# Patient Record
Sex: MALE | Race: WHITE | NOT HISPANIC OR LATINO
[De-identification: names, ages, dates, MRNs, and addresses within clinical notes are randomized per-mention and may not be internally consistent; named-entity substitution may affect disease eponyms.]

---

## 2023-01-05 PROBLEM — Z00.00 ENCOUNTER FOR PREVENTIVE HEALTH EXAMINATION: Status: ACTIVE | Noted: 2023-01-05

## 2023-01-09 DIAGNOSIS — A69.20 LYME DISEASE, UNSPECIFIED: ICD-10-CM

## 2023-01-09 DIAGNOSIS — Z78.9 OTHER SPECIFIED HEALTH STATUS: ICD-10-CM

## 2023-01-09 DIAGNOSIS — M51.27 OTHER INTERVERTEBRAL DISC DISPLACEMENT, LUMBOSACRAL REGION: ICD-10-CM

## 2023-01-09 DIAGNOSIS — K21.9 GASTRO-ESOPHAGEAL REFLUX DISEASE W/OUT ESOPHAGITIS: ICD-10-CM

## 2023-01-10 ENCOUNTER — APPOINTMENT (OUTPATIENT)
Dept: SURGERY | Facility: CLINIC | Age: 53
End: 2023-01-10
Payer: COMMERCIAL

## 2023-01-10 VITALS
HEIGHT: 70 IN | BODY MASS INDEX: 18.61 KG/M2 | WEIGHT: 130 LBS | HEART RATE: 60 BPM | SYSTOLIC BLOOD PRESSURE: 112 MMHG | DIASTOLIC BLOOD PRESSURE: 64 MMHG

## 2023-01-10 DIAGNOSIS — Z86.19 PERSONAL HISTORY OF OTHER INFECTIOUS AND PARASITIC DISEASES: ICD-10-CM

## 2023-01-10 PROCEDURE — 99204 OFFICE O/P NEW MOD 45 MIN: CPT

## 2023-01-10 NOTE — CONSULT LETTER
[Dear  ___] : Dear  [unfilled], [Consult Letter:] : I had the pleasure of evaluating your patient, [unfilled]. [Please see my note below.] : Please see my note below. [Consult Closing:] : Thank you very much for allowing me to participate in the care of this patient.  If you have any questions, please do not hesitate to contact me. [Sincerely,] : Sincerely, [FreeTextEntry3] : Kika Prieto MD FACS\par Director, [facility] Division of General Surgery \par Acute Care Surgery\par [facility]\par \par Office phone: [phone]\par Cell phone:  [phone]\par email:  [email or web address]\par

## 2023-01-10 NOTE — HISTORY OF PRESENT ILLNESS
[de-identified] : 52 yr old w pain in the left groin since Nov 23 - some pain in the right groin as well.  + bulge bilaterally.  He takes ibuprofen with little relief.  It is worse with sitting, best when lying down.  Denies obstructive symptoms. Also endorses some burning and tugging sensation to the groinds. Straining also irritates the area. 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  His only prior surgery is an open appendectomy in the past. He takes no meds and has no allergies. \par He lives w a friend as he and his wife are .

## 2023-01-10 NOTE — PLAN
[FreeTextEntry1] : IVAN BRUNNER has a  biilateral nguinal hernias determined by physical exam.  We discussed the options for management which include observation of asymptomatic or minimally symptomatic hernias, as well as surgical repair.  I have recommended a robotic approach to this particular hernia given the quicker recovery time and superior visualization of the anatomy.  We discussed the risks of the surgery which include, but are not limited to injury to the gonadal structures, intestinal injury, vascular injury, hernia recurrence, mesh infection, seroma, or hematoma.  We discussed the likelihood of bruising and swelling in the immediate post op period in the groin and that this should be managed with ice packs for the first 24 hrs. \par Post operative activity restrictions include no heavy lifting or core activities/straining for 10 days. Routine daily activities can be resumed in 1-2 days and light activity (such as walking for exercise or jogging) can be resumed in 7 days.  Driving can be resumed when pain level is felt to be manageable.   No swimming in pool or hot tub for 1 week post operatively.  Return to work is generally in 7-10 days, but is individualized according to the patient.  Mr. BRUNNER understands the risks benefits and alternatives of the proposed inguinal hernia repair and would like to proceed as soon as possible given the discomfort he is in. \par \par We will determine a date for surgery at the his convenience and obtain medical clearance as appropriate. \par \par \par

## 2023-01-10 NOTE — PHYSICAL EXAM
[Respiratory Effort] : normal respiratory effort [de-identified] : NAD [de-identified] : moderate size bilateral inguinal hernias, reducible.  + bulge bilaterally. minimaly tender.

## 2023-01-10 NOTE — REASON FOR VISIT
[Initial Eval - Existing Diagnosis] : an initial evaluation of an existing diagnosis [FreeTextEntry1] : bilateral inguinal hernia

## 2023-01-23 ENCOUNTER — APPOINTMENT (OUTPATIENT)
Dept: SURGERY | Facility: HOSPITAL | Age: 53
End: 2023-01-23

## 2023-01-23 ENCOUNTER — TRANSCRIPTION ENCOUNTER (OUTPATIENT)
Age: 53
End: 2023-01-23

## 2023-01-24 ENCOUNTER — TRANSCRIPTION ENCOUNTER (OUTPATIENT)
Age: 53
End: 2023-01-24

## 2023-01-31 ENCOUNTER — APPOINTMENT (OUTPATIENT)
Dept: SURGERY | Facility: CLINIC | Age: 53
End: 2023-01-31
Payer: COMMERCIAL

## 2023-01-31 VITALS
HEIGHT: 70 IN | WEIGHT: 131 LBS | DIASTOLIC BLOOD PRESSURE: 76 MMHG | TEMPERATURE: 98.2 F | SYSTOLIC BLOOD PRESSURE: 126 MMHG | HEART RATE: 49 BPM | BODY MASS INDEX: 18.75 KG/M2

## 2023-01-31 DIAGNOSIS — K40.20 BILATERAL INGUINAL HERNIA, W/OUT OBSTRUCTION OR GANGRENE, NOT SPECIFIED AS RECURRENT: ICD-10-CM

## 2023-01-31 PROCEDURE — 99024 POSTOP FOLLOW-UP VISIT: CPT

## 2023-01-31 NOTE — CONSULT LETTER
[Dear  ___] : Dear  [unfilled], [Consult Letter:] : I had the pleasure of evaluating your patient, [unfilled]. [Please see my note below.] : Please see my note below. [Consult Closing:] : Thank you very much for allowing me to participate in the care of this patient.  If you have any questions, please do not hesitate to contact me. [Sincerely,] : Sincerely, [FreeTextEntry3] : Kika Prieto MD FACS\par Director, [facility] Division of General Surgery \par Acute Care Surgery\par [facility]\par \par Office phone: [phone]\par Cell phone:  [phone]\par email:  [email or web address]\par

## 2023-01-31 NOTE — HISTORY OF PRESENT ILLNESS
[de-identified] : 52 yr old male s/p robotic bilateral inguinal hernia repair w mesh on 1/ 23/23.  He is only 8 days post op but doing very well. He has no pain and has been walking 4 miles a day. He is tolerating a diet and having normal bowel movements.  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

## 2023-01-31 NOTE — PLAN
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  Otherwise he has no restrictions and may follow up as needed.

## 2023-03-08 ENCOUNTER — NON-APPOINTMENT (OUTPATIENT)
Age: 53
End: 2023-03-08

## 2024-04-22 ENCOUNTER — NON-APPOINTMENT (OUTPATIENT)
Age: 54
End: 2024-04-22

## 2024-04-23 ENCOUNTER — APPOINTMENT (OUTPATIENT)
Dept: SURGERY | Facility: CLINIC | Age: 54
End: 2024-04-23
Payer: COMMERCIAL

## 2024-04-23 VITALS — TEMPERATURE: 97.9 F | DIASTOLIC BLOOD PRESSURE: 77 MMHG | HEART RATE: 51 BPM | SYSTOLIC BLOOD PRESSURE: 135 MMHG

## 2024-04-23 DIAGNOSIS — R10.32 LEFT LOWER QUADRANT PAIN: ICD-10-CM

## 2024-04-23 PROCEDURE — 99214 OFFICE O/P EST MOD 30 MIN: CPT

## 2024-04-23 NOTE — ASSESSMENT
[FreeTextEntry1] : Left groin pain 1 yr after bilateral robotic inguinal hernia repair w mesh.   Could be due to nerve pain or possible early recurrence.   Muscle strain a possibility but less likely given the improvement of pain throughout the duration of his exercise run.

## 2024-04-23 NOTE — PLAN
[FreeTextEntry1] : MRI pelvis to evaluate.   Will call patient with results once performed and read.

## 2024-04-23 NOTE — HISTORY OF PRESENT ILLNESS
[de-identified] : 53 yr old pt of Dr. Meraz, , who had robotic bilateral inguinal hernia surgery in Jan 2023. He did very well post op and actually completed a 1200 mile walk after surgery.  He states he began having pain in the left ischial area, especially when he sits, in the beginning of Jan 2024.  He now notices that he has left groin pain for the past month.  He went on a 4 mile run yesterday and it only hurt in the beginning - and then goes away.  He does not see a recurrent bulge, but states the discomfort feels "deeper" and radiates to his upper inner left thigh.  He does not describe it as a burning pain -only a discomfort.

## 2024-04-23 NOTE — PHYSICAL EXAM
[Alert] : alert [Oriented to Person] : oriented to person [Oriented to Place] : oriented to place [Calm] : calm [de-identified] : NAD [de-identified] : no recurrent inguinal hernia bulge on either left or right. nontender to palpation in left groin.   [de-identified] : no evidence of abscess or irriation in the perirectal or ischial tuberosity area.

## 2024-04-23 NOTE — CONSULT LETTER
[Dear  ___] : Dear  [unfilled], [Courtesy Letter:] : I had the pleasure of seeing your patient, [unfilled], in my office today. [Please see my note below.] : Please see my note below. [Consult Closing:] : Thank you very much for allowing me to participate in the care of this patient.  If you have any questions, please do not hesitate to contact me. [Sincerely,] : Sincerely, [FreeTextEntry3] : Kika Prieto MD FACS Director, [facility] Division of General and Acute Care Surgery Director, Surgical Quality for [facility] Interim Director [facility]   Office phone: [phone] Cell phone:  [phone] email:  [email or web address]

## 2025-02-27 ENCOUNTER — NON-APPOINTMENT (OUTPATIENT)
Age: 55
End: 2025-02-27

## 2025-03-03 PROBLEM — Z00.00 ENCOUNTER FOR PREVENTIVE HEALTH EXAMINATION: Status: ACTIVE | Noted: 2025-03-03

## 2025-03-12 ENCOUNTER — APPOINTMENT (OUTPATIENT)
Dept: UROLOGY | Facility: CLINIC | Age: 55
End: 2025-03-12
Payer: COMMERCIAL

## 2025-03-12 VITALS
WEIGHT: 140 LBS | HEART RATE: 62 BPM | SYSTOLIC BLOOD PRESSURE: 120 MMHG | DIASTOLIC BLOOD PRESSURE: 78 MMHG | HEIGHT: 68 IN | BODY MASS INDEX: 21.22 KG/M2

## 2025-03-12 DIAGNOSIS — N50.811 RIGHT TESTICULAR PAIN: ICD-10-CM

## 2025-03-12 DIAGNOSIS — Z87.19 PERSONAL HISTORY OF OTHER DISEASES OF THE DIGESTIVE SYSTEM: ICD-10-CM

## 2025-03-12 RX ORDER — TAMSULOSIN HYDROCHLORIDE 0.4 MG/1
0.4 CAPSULE ORAL
Qty: 30 | Refills: 2 | Status: ACTIVE | COMMUNITY
Start: 2025-03-12 | End: 1900-01-01

## 2025-04-23 ENCOUNTER — NON-APPOINTMENT (OUTPATIENT)
Age: 55
End: 2025-04-23

## 2025-04-23 ENCOUNTER — APPOINTMENT (OUTPATIENT)
Dept: UROLOGY | Facility: CLINIC | Age: 55
End: 2025-04-23
Payer: COMMERCIAL

## 2025-04-23 VITALS
BODY MASS INDEX: 21.22 KG/M2 | HEIGHT: 68 IN | DIASTOLIC BLOOD PRESSURE: 80 MMHG | HEART RATE: 47 BPM | SYSTOLIC BLOOD PRESSURE: 130 MMHG | WEIGHT: 140 LBS

## 2025-04-23 DIAGNOSIS — R31.29 OTHER MICROSCOPIC HEMATURIA: ICD-10-CM

## 2025-04-23 PROCEDURE — 99203 OFFICE O/P NEW LOW 30 MIN: CPT

## 2025-04-23 PROCEDURE — 81015 MICROSCOPIC EXAM OF URINE: CPT

## 2025-04-23 RX ORDER — TADALAFIL 5 MG/1
5 TABLET ORAL
Qty: 30 | Refills: 3 | Status: ACTIVE | COMMUNITY
Start: 2025-04-23 | End: 1900-01-01

## 2025-05-02 LAB — BACTERIA UR CULT: NORMAL
